# Patient Record
Sex: FEMALE | URBAN - NONMETROPOLITAN AREA
[De-identification: names, ages, dates, MRNs, and addresses within clinical notes are randomized per-mention and may not be internally consistent; named-entity substitution may affect disease eponyms.]

---

## 2022-09-28 ENCOUNTER — APPOINTMENT (RX ONLY)
Dept: URBAN - NONMETROPOLITAN AREA CLINIC 16 | Facility: CLINIC | Age: 47
Setting detail: DERMATOLOGY
End: 2022-09-28

## 2022-09-28 VITALS — HEIGHT: 55 IN | WEIGHT: 160 LBS

## 2022-09-28 DIAGNOSIS — L30.9 DERMATITIS, UNSPECIFIED: ICD-10-CM | Status: IMPROVED

## 2022-09-28 DIAGNOSIS — L82.1 OTHER SEBORRHEIC KERATOSIS: ICD-10-CM

## 2022-09-28 DIAGNOSIS — D18.0 HEMANGIOMA: ICD-10-CM

## 2022-09-28 PROBLEM — D18.01 HEMANGIOMA OF SKIN AND SUBCUTANEOUS TISSUE: Status: ACTIVE | Noted: 2022-09-28

## 2022-09-28 PROCEDURE — ? TREATMENT REGIMEN

## 2022-09-28 PROCEDURE — ? FULL BODY SKIN EXAM - DECLINED

## 2022-09-28 PROCEDURE — 99203 OFFICE O/P NEW LOW 30 MIN: CPT

## 2022-09-28 PROCEDURE — ? COUNSELING

## 2022-09-28 ASSESSMENT — LOCATION SIMPLE DESCRIPTION DERM
LOCATION SIMPLE: LEFT BREAST
LOCATION SIMPLE: LEFT LIP

## 2022-09-28 ASSESSMENT — LOCATION DETAILED DESCRIPTION DERM
LOCATION DETAILED: LEFT MEDIAL BREAST 10-11:00 REGION
LOCATION DETAILED: LEFT INFERIOR VERMILION LIP

## 2022-09-28 ASSESSMENT — LOCATION ZONE DERM
LOCATION ZONE: TRUNK
LOCATION ZONE: LIP

## 2022-09-28 ASSESSMENT — SEVERITY ASSESSMENT: SEVERITY: MILD TO MODERATE

## 2022-09-28 ASSESSMENT — BSA RASH: BSA RASH: 2

## 2022-09-28 NOTE — PROCEDURE: TREATMENT REGIMEN
Plan: Pt states her lips have been extremely dry and chapped since June of this year. They become dry and crack, and it has spread to above her upper lip. Her dentist recommended she try carmex. She states when she would eat any nuts, they would then ooze and crack as well; however never became edematous. When questioned about salt intake she states that it could be related to that. \\n\\nPt states that she did buy a different mouth wash that she recently d/c. She states that she does drink out of a round opening water bottle; however it has not changed nor the way that she washes it. She states that she did try a different toothpaste but this was after dermatitis. \\n\\nShe states that it did seem to worsen with sun exposure; however dermatitis does not appear to be actinic in nature. \\n\\n\\nPt states that it has recently been improving and when questions states that it may have been around the time she d/c new mouthwash. \\n\\[facility] recommends pt stops using carmex. Recommends pt uses Vaseline or Aquaphor for her lips. If this doesn’t go away pt can use hydrocortisone cream on her lips to help with the redness. If there is no improvement with any of these tx options pt is to call and we will determine the next steps. Plan: Pt states her lips have been extremely dry and chapped since June of this year. They become dry and crack, and it has spread to above her upper lip. Her dentist recommended she try carmex. She states when she would eat any nuts, they would then ooze and crack as well; however never became edematous. When questioned about salt intake she states that it could be related to that. \\n\\nPt states that she did buy a different mouth wash that she recently d/c. She states that she does drink out of a round opening water bottle; however it has not changed nor the way that she washes it. She states that she did try a different toothpaste but this was after dermatitis. \\n\\nShe states that it did seem to worsen with sun exposure; however dermatitis does not appear to be actinic in nature. \\n\\n\\nPt states that it has recently been improving and when questions states that it may have been around the time she d/c new mouthwash. \\n\\[facility] recommends pt stops using carmex. Recommends pt uses Vaseline or Aquaphor for her lips. If this doesn’t go away pt can use hydrocortisone cream on her lips to help with the redness. If there is no improvement with any of these tx options pt is to call and we will determine the next steps.